# Patient Record
Sex: FEMALE | Race: WHITE | Employment: OTHER | URBAN - METROPOLITAN AREA
[De-identification: names, ages, dates, MRNs, and addresses within clinical notes are randomized per-mention and may not be internally consistent; named-entity substitution may affect disease eponyms.]

---

## 2018-05-31 ENCOUNTER — HOSPITAL ENCOUNTER (OUTPATIENT)
Dept: MAMMOGRAPHY | Age: 68
Discharge: HOME | End: 2018-06-02
Payer: MEDICARE

## 2018-05-31 ENCOUNTER — HOSPITAL ENCOUNTER (OUTPATIENT)
Age: 68
Discharge: HOME | End: 2018-06-02
Payer: MEDICARE

## 2018-05-31 DIAGNOSIS — Z12.31 ENCOUNTER FOR SCREENING MAMMOGRAM FOR MALIGNANT NEOPLASM OF BREAST: ICD-10-CM

## 2018-05-31 PROCEDURE — 77063 BREAST TOMOSYNTHESIS BI: CPT

## 2019-12-27 ENCOUNTER — HOSPITAL ENCOUNTER (OUTPATIENT)
Age: 69
Discharge: HOME | End: 2019-12-29
Payer: MEDICARE

## 2019-12-27 ENCOUNTER — HOSPITAL ENCOUNTER (OUTPATIENT)
Dept: MAMMOGRAPHY | Age: 69
Discharge: HOME | End: 2019-12-29
Payer: MEDICARE

## 2019-12-27 DIAGNOSIS — Z12.31 BREAST CANCER SCREENING BY MAMMOGRAM: ICD-10-CM

## 2019-12-27 PROCEDURE — 77063 BREAST TOMOSYNTHESIS BI: CPT

## 2021-02-26 ENCOUNTER — IMMUNIZATION (OUTPATIENT)
Dept: PRIMARY CARE CLINIC | Age: 71
End: 2021-02-26
Payer: MEDICARE

## 2021-02-26 PROCEDURE — 0001A COVID-19, PFIZER VACCINE 30MCG/0.3ML DOSE: CPT | Performed by: PHYSICIAN ASSISTANT

## 2021-02-26 PROCEDURE — 91300 COVID-19, PFIZER VACCINE 30MCG/0.3ML DOSE: CPT | Performed by: PHYSICIAN ASSISTANT

## 2021-03-22 ENCOUNTER — IMMUNIZATION (OUTPATIENT)
Dept: PRIMARY CARE CLINIC | Age: 71
End: 2021-03-22
Payer: MEDICARE

## 2021-03-22 PROCEDURE — 0002A COVID-19, PFIZER VACCINE 30MCG/0.3ML DOSE: CPT | Performed by: NURSE PRACTITIONER

## 2021-03-22 PROCEDURE — 91300 COVID-19, PFIZER VACCINE 30MCG/0.3ML DOSE: CPT | Performed by: NURSE PRACTITIONER

## 2021-04-13 ENCOUNTER — HOSPITAL ENCOUNTER (OUTPATIENT)
Dept: MAMMOGRAPHY | Age: 71
Discharge: HOME | End: 2021-04-15
Payer: MEDICARE

## 2021-04-13 ENCOUNTER — HOSPITAL ENCOUNTER (OUTPATIENT)
Age: 71
Discharge: HOME | End: 2021-04-15
Payer: MEDICARE

## 2021-04-13 DIAGNOSIS — Z12.31 ENCOUNTER FOR SCREENING MAMMOGRAM FOR MALIGNANT NEOPLASM OF BREAST: ICD-10-CM

## 2021-04-13 PROCEDURE — 77063 BREAST TOMOSYNTHESIS BI: CPT

## 2022-03-28 ENCOUNTER — OFFICE VISIT (OUTPATIENT)
Dept: PODIATRY | Age: 72
End: 2022-03-28
Payer: MEDICARE

## 2022-03-28 VITALS — BODY MASS INDEX: 27.32 KG/M2 | WEIGHT: 170 LBS | HEIGHT: 66 IN

## 2022-03-28 DIAGNOSIS — M79.671 RIGHT FOOT PAIN: ICD-10-CM

## 2022-03-28 DIAGNOSIS — M25.572 SINUS TARSITIS OF LEFT FOOT: ICD-10-CM

## 2022-03-28 DIAGNOSIS — M19.071 OSTEOARTHRITIS OF BOTH FEET, UNSPECIFIED OSTEOARTHRITIS TYPE: ICD-10-CM

## 2022-03-28 DIAGNOSIS — R26.2 DIFFICULTY WALKING: ICD-10-CM

## 2022-03-28 DIAGNOSIS — M79.672 LEFT FOOT PAIN: ICD-10-CM

## 2022-03-28 DIAGNOSIS — Q66.6 PES VALGUS: Primary | ICD-10-CM

## 2022-03-28 DIAGNOSIS — M19.072 OSTEOARTHRITIS OF BOTH FEET, UNSPECIFIED OSTEOARTHRITIS TYPE: ICD-10-CM

## 2022-03-28 PROCEDURE — 20605 DRAIN/INJ JOINT/BURSA W/O US: CPT | Performed by: PODIATRIST

## 2022-03-28 PROCEDURE — 99203 OFFICE O/P NEW LOW 30 MIN: CPT | Performed by: PODIATRIST

## 2022-03-28 RX ORDER — METHYLPREDNISOLONE ACETATE 40 MG/ML
40 INJECTION, SUSPENSION INTRA-ARTICULAR; INTRALESIONAL; INTRAMUSCULAR; SOFT TISSUE ONCE
Status: COMPLETED | OUTPATIENT
Start: 2022-03-28 | End: 2022-03-28

## 2022-03-28 RX ADMIN — METHYLPREDNISOLONE ACETATE 40 MG: 40 INJECTION, SUSPENSION INTRA-ARTICULAR; INTRALESIONAL; INTRAMUSCULAR; SOFT TISSUE at 10:52

## 2022-03-28 NOTE — PROGRESS NOTES
3/28/22     Jelani Sepulveda    : 1950 Sex: female   Age: 66 y.o. Patient was referred by: None  Patient's PCP/Provider is:  Ana Lilia Mcbride MD    Subjective:    Patient seen today for evaluation regarding bilateral foot pain. Chief Complaint   Patient presents with    Foot Pain     bilateral feet        HPI: Patient is complaining about more discomfort into the left hindfoot and ankle region with every day ambulatory activities. Patient denies any recent injury to both lower extremities. Patient does have an upcoming vacation and she would like treatment performed to get her through her trip. She has not tried any OTC treatments to this point in time. No other additional abnormalities noted. ROS:  Const: Positives and pertinent negatives as per HPI. Musculo: Denies symptoms other than stated above. Neuro: Denies symptoms other than stated above. Skin: Denies symptoms other than stated above. Current Medications:    Current Outpatient Medications:     Multiple Vitamins-Minerals (THERAPEUTIC MULTIVITAMIN-MINERALS) tablet, Take 1 tablet by mouth daily, Disp: , Rfl:     Allergies:  No Known Allergies    Vitals:    22 1023   Weight: 170 lb (77.1 kg)   Height: 5' 6\" (1.676 m)        No past medical history on file. No family history on file. No past surgical history on file. Social History     Tobacco Use    Smoking status: Never Smoker    Smokeless tobacco: Not on file   Substance Use Topics    Alcohol use: No    Drug use: Not on file           Diagnostic studies:    No results found. Procedures:    Patient did receive a cortisone injection into the symptomatic sinus tarsi region left foot. Injection did consist of 2 cc of dexamethasone phosphate in efforts to decrease pain, swelling, and inflammation present. Patient tolerated the injection without issues noted. Exam:  VASCULAR: Pedal pulses palpable bilateral foot.   Capillary refill time less than 3 seconds digits 1 through 5 bilateral foot. NEUROLOGICAL: Epicritic sensations intact and symmetric  DERMATOLOGICAL: Hyperkeratotic areas noted into the digital regions and plantar forefoot areas secondary to valgus issues present. No maceration of webspaces noted bilateral foot. MUSCULOSKELETAL: Severe valgus issues noted bilateral foot with associated bunion deformities bilaterally. Tenderness noted to the sinus tarsi region left hindfoot/ankle region. Antalgic gait noted upon evaluation. Tenderness also noted along the medial PT tendon region with range of motion and muscle testing performed. Plan Per Assessment  Placido Muñoz was seen today for foot pain. Diagnoses and all orders for this visit:    Pes valgus    Osteoarthritis of both feet, unspecified osteoarthritis type    Sinus tarsitis of left foot  -     methylPREDNISolone acetate (DEPO-MEDROL) injection 40 mg    Left foot pain  -     XR FOOT LEFT (MIN 3 VIEWS); Future  -     methylPREDNISolone acetate (DEPO-MEDROL) injection 40 mg    Right foot pain  -     XR FOOT RIGHT (MIN 3 VIEWS); Future    Difficulty walking        1. New patient evaluation and management  2. Radiographs were ordered and reviewed with patient in detail today. Severe valgus issues noted with associated osteoarthritic changes midfoot regions bilaterally. 3. We did recommend conservative care options at this time in regards to appropriate shoe gear and sandals to wear on her upcoming trip. 4. Patient did receive a cortisone injection into the symptomatic left hindfoot/sinus tarsi region as described above. 5. Patient will be followed up when she does get back into town for continued evaluation and management. We will discuss potentially getting her fitted for custom foot orthoses. Seen By:    Salvatore Delgado DPM    Electronically signed by Salvatore Delgado DPM on 3/28/2022 at 12:03 PM      This note was created using voice recognition software.   The note was reviewed however may contain grammatical errors.

## 2022-03-28 NOTE — PROGRESS NOTES
Patient presents with bilateral foot pain. Patient states no injury. Patient states left foot pain is worse.  Manuel Gomez MD  Electronically signed by Cipriano Smiley LPN on 5/55/9411 at 16:23 AM